# Patient Record
Sex: FEMALE | Race: WHITE | NOT HISPANIC OR LATINO | Employment: OTHER | URBAN - METROPOLITAN AREA
[De-identification: names, ages, dates, MRNs, and addresses within clinical notes are randomized per-mention and may not be internally consistent; named-entity substitution may affect disease eponyms.]

---

## 2020-09-29 ENCOUNTER — HISTORICAL (OUTPATIENT)
Dept: ADMINISTRATIVE | Facility: HOSPITAL | Age: 85
End: 2020-09-29

## 2020-10-01 LAB — FINAL CULTURE: NORMAL

## 2022-05-27 ENCOUNTER — HOSPITAL ENCOUNTER (INPATIENT)
Facility: HOSPITAL | Age: 87
LOS: 1 days | Discharge: HOME | DRG: 125 | End: 2022-05-28
Attending: EMERGENCY MEDICINE | Admitting: SURGERY
Payer: MEDICARE

## 2022-05-27 DIAGNOSIS — S02.31XA CLOSED BLOW-OUT FRACTURE OF RIGHT ORBITAL FLOOR: Primary | ICD-10-CM

## 2022-05-27 DIAGNOSIS — H05.239 RETROBULBAR HEMATOMA: ICD-10-CM

## 2022-05-27 DIAGNOSIS — S09.90XA CLOSED HEAD INJURY WITHOUT LOSS OF CONSCIOUSNESS, INITIAL ENCOUNTER: ICD-10-CM

## 2022-05-27 DIAGNOSIS — K92.0 HEMATEMESIS WITH NAUSEA: ICD-10-CM

## 2022-05-27 DIAGNOSIS — R42 DIZZINESS: ICD-10-CM

## 2022-05-27 LAB
ALBUMIN SERPL-MCNC: 3.5 GM/DL (ref 3.4–4.8)
ALBUMIN/GLOB SERPL: 1.1 RATIO (ref 1.1–2)
ALP SERPL-CCNC: 94 UNIT/L (ref 40–150)
ALT SERPL-CCNC: 37 UNIT/L (ref 0–55)
APTT PPP: 27.3 SECONDS (ref 23.2–33.7)
AST SERPL-CCNC: 74 UNIT/L (ref 5–34)
BASOPHILS # BLD AUTO: 0.03 X10(3)/MCL (ref 0–0.2)
BASOPHILS NFR BLD AUTO: 0.3 %
BILIRUBIN DIRECT+TOT PNL SERPL-MCNC: 0.5 MG/DL
BUN SERPL-MCNC: 14.7 MG/DL (ref 8.4–25.7)
CALCIUM SERPL-MCNC: 9.2 MG/DL (ref 8.8–10)
CHLORIDE SERPL-SCNC: 105 MMOL/L (ref 98–107)
CO2 SERPL-SCNC: 22 MMOL/L (ref 23–31)
CREAT SERPL-MCNC: 0.9 MG/DL (ref 0.73–1.18)
EOSINOPHIL # BLD AUTO: 0.12 X10(3)/MCL (ref 0–0.9)
EOSINOPHIL NFR BLD AUTO: 1.2 %
ERYTHROCYTE [DISTWIDTH] IN BLOOD BY AUTOMATED COUNT: 14.5 % (ref 11.5–17)
ETHANOL SERPL-MCNC: 27 MG/DL
GLOBULIN SER-MCNC: 3.1 GM/DL (ref 2.4–3.5)
GLUCOSE SERPL-MCNC: 97 MG/DL (ref 82–115)
GROUP & RH: NORMAL
HCT VFR BLD AUTO: 39.4 %
HGB BLD-MCNC: 12.8 GM/DL
IMM GRANULOCYTES # BLD AUTO: 0.07 X10(3)/MCL (ref 0–0.02)
IMM GRANULOCYTES NFR BLD AUTO: 0.7 % (ref 0–0.43)
INDIRECT COOMBS GEL: NORMAL
INR BLD: 1.05 (ref 0–1.3)
LACTATE SERPL-SCNC: 1.1 MMOL/L (ref 0.5–2.2)
LACTATE SERPL-SCNC: 2.8 MMOL/L (ref 0.5–2.2)
LYMPHOCYTES # BLD AUTO: 1.88 X10(3)/MCL (ref 0.6–4.6)
LYMPHOCYTES NFR BLD AUTO: 19 %
MCH RBC QN AUTO: 33.1 PG (ref 27–31)
MCHC RBC AUTO-ENTMCNC: 32.5 MG/DL (ref 33–36)
MCV RBC AUTO: 101.8 FL (ref 80–94)
MONOCYTES # BLD AUTO: 0.8 X10(3)/MCL (ref 0.1–1.3)
MONOCYTES NFR BLD AUTO: 8.1 %
NEUTROPHILS # BLD AUTO: 7 X10(3)/MCL (ref 2.1–9.2)
NEUTROPHILS NFR BLD AUTO: 70.7 %
NRBC BLD AUTO-RTO: 0 %
PLATELET # BLD AUTO: 208 X10(3)/MCL (ref 130–400)
PMV BLD AUTO: 12.2 FL (ref 9.4–12.4)
POTASSIUM SERPL-SCNC: 4 MMOL/L (ref 3.5–5.1)
PROT SERPL-MCNC: 6.6 GM/DL (ref 5.8–7.6)
PROTHROMBIN TIME: 13.4 SECONDS (ref 12.5–14.5)
RBC # BLD AUTO: 3.87 X10(6)/MCL
SODIUM SERPL-SCNC: 138 MMOL/L (ref 136–145)
WBC # SPEC AUTO: 9.9 X10(3)/MCL (ref 4.5–11.5)

## 2022-05-27 PROCEDURE — 25000003 PHARM REV CODE 250: Performed by: EMERGENCY MEDICINE

## 2022-05-27 PROCEDURE — 96376 TX/PRO/DX INJ SAME DRUG ADON: CPT

## 2022-05-27 PROCEDURE — 36415 COLL VENOUS BLD VENIPUNCTURE: CPT | Performed by: SURGERY

## 2022-05-27 PROCEDURE — 99285 EMERGENCY DEPT VISIT HI MDM: CPT | Mod: 25

## 2022-05-27 PROCEDURE — 96365 THER/PROPH/DIAG IV INF INIT: CPT

## 2022-05-27 PROCEDURE — 11000001 HC ACUTE MED/SURG PRIVATE ROOM

## 2022-05-27 PROCEDURE — 63600175 PHARM REV CODE 636 W HCPCS: Performed by: STUDENT IN AN ORGANIZED HEALTH CARE EDUCATION/TRAINING PROGRAM

## 2022-05-27 PROCEDURE — 86850 RBC ANTIBODY SCREEN: CPT | Performed by: SURGERY

## 2022-05-27 PROCEDURE — 96372 THER/PROPH/DIAG INJ SC/IM: CPT | Performed by: EMERGENCY MEDICINE

## 2022-05-27 PROCEDURE — 83605 ASSAY OF LACTIC ACID: CPT | Performed by: SURGERY

## 2022-05-27 PROCEDURE — C9113 INJ PANTOPRAZOLE SODIUM, VIA: HCPCS | Performed by: EMERGENCY MEDICINE

## 2022-05-27 PROCEDURE — 80053 COMPREHEN METABOLIC PANEL: CPT | Performed by: SURGERY

## 2022-05-27 PROCEDURE — 82077 ASSAY SPEC XCP UR&BREATH IA: CPT | Performed by: SURGERY

## 2022-05-27 PROCEDURE — 85730 THROMBOPLASTIN TIME PARTIAL: CPT | Performed by: SURGERY

## 2022-05-27 PROCEDURE — 96375 TX/PRO/DX INJ NEW DRUG ADDON: CPT

## 2022-05-27 PROCEDURE — 63600175 PHARM REV CODE 636 W HCPCS: Performed by: EMERGENCY MEDICINE

## 2022-05-27 PROCEDURE — 94761 N-INVAS EAR/PLS OXIMETRY MLT: CPT

## 2022-05-27 PROCEDURE — 85025 COMPLETE CBC W/AUTO DIFF WBC: CPT | Performed by: SURGERY

## 2022-05-27 PROCEDURE — 93005 ELECTROCARDIOGRAM TRACING: CPT

## 2022-05-27 PROCEDURE — 25000003 PHARM REV CODE 250: Performed by: STUDENT IN AN ORGANIZED HEALTH CARE EDUCATION/TRAINING PROGRAM

## 2022-05-27 PROCEDURE — G0390 TRAUMA RESPONS W/HOSP CRITI: HCPCS

## 2022-05-27 PROCEDURE — 85610 PROTHROMBIN TIME: CPT | Performed by: SURGERY

## 2022-05-27 RX ORDER — FENTANYL CITRATE 50 UG/ML
25 INJECTION, SOLUTION INTRAMUSCULAR; INTRAVENOUS
Status: COMPLETED | OUTPATIENT
Start: 2022-05-27 | End: 2022-05-27

## 2022-05-27 RX ORDER — ONDANSETRON 2 MG/ML
4 INJECTION INTRAMUSCULAR; INTRAVENOUS EVERY 6 HOURS PRN
Status: DISCONTINUED | OUTPATIENT
Start: 2022-05-27 | End: 2022-05-28 | Stop reason: HOSPADM

## 2022-05-27 RX ORDER — CELECOXIB 100 MG/1
100 CAPSULE ORAL 2 TIMES DAILY
COMMUNITY

## 2022-05-27 RX ORDER — OXYCODONE HYDROCHLORIDE 5 MG/1
10 TABLET ORAL EVERY 4 HOURS PRN
Status: DISCONTINUED | OUTPATIENT
Start: 2022-05-27 | End: 2022-05-28 | Stop reason: HOSPADM

## 2022-05-27 RX ORDER — ASPIRIN 81 MG/1
81 TABLET ORAL DAILY
COMMUNITY

## 2022-05-27 RX ORDER — CITALOPRAM 40 MG/1
40 TABLET, FILM COATED ORAL DAILY
Status: ON HOLD | COMMUNITY
End: 2022-05-28 | Stop reason: HOSPADM

## 2022-05-27 RX ORDER — NYSTATIN 100000 U/G
CREAM TOPICAL 2 TIMES DAILY
Status: ON HOLD | COMMUNITY
End: 2022-05-28 | Stop reason: HOSPADM

## 2022-05-27 RX ORDER — MELATONIN 5 MG
CAPSULE ORAL
COMMUNITY

## 2022-05-27 RX ORDER — GABAPENTIN 100 MG/1
100 CAPSULE ORAL 3 TIMES DAILY
Status: DISCONTINUED | OUTPATIENT
Start: 2022-05-27 | End: 2022-05-28 | Stop reason: HOSPADM

## 2022-05-27 RX ORDER — SODIUM CHLORIDE 9 MG/ML
INJECTION, SOLUTION INTRAVENOUS CONTINUOUS
Status: DISCONTINUED | OUTPATIENT
Start: 2022-05-27 | End: 2022-05-28

## 2022-05-27 RX ORDER — NEBIVOLOL 5 MG/1
5 TABLET ORAL DAILY
Status: ON HOLD | COMMUNITY
End: 2022-05-28 | Stop reason: HOSPADM

## 2022-05-27 RX ORDER — OXYCODONE HYDROCHLORIDE 5 MG/1
5 TABLET ORAL EVERY 4 HOURS PRN
Status: DISCONTINUED | OUTPATIENT
Start: 2022-05-27 | End: 2022-05-28 | Stop reason: HOSPADM

## 2022-05-27 RX ORDER — ROPINIROLE 2 MG/1
1 TABLET, FILM COATED ORAL NIGHTLY
COMMUNITY

## 2022-05-27 RX ORDER — ACETAMINOPHEN 325 MG/1
650 TABLET ORAL EVERY 6 HOURS PRN
Status: DISCONTINUED | OUTPATIENT
Start: 2022-05-27 | End: 2022-05-27

## 2022-05-27 RX ORDER — ACETAMINOPHEN 325 MG/1
650 TABLET ORAL EVERY 6 HOURS
Status: DISCONTINUED | OUTPATIENT
Start: 2022-05-28 | End: 2022-05-28 | Stop reason: HOSPADM

## 2022-05-27 RX ORDER — PROMETHAZINE HYDROCHLORIDE 25 MG/ML
12.5 INJECTION, SOLUTION INTRAMUSCULAR; INTRAVENOUS
Status: COMPLETED | OUTPATIENT
Start: 2022-05-27 | End: 2022-05-27

## 2022-05-27 RX ORDER — TALC
6 POWDER (GRAM) TOPICAL NIGHTLY PRN
Status: DISCONTINUED | OUTPATIENT
Start: 2022-05-27 | End: 2022-05-28 | Stop reason: HOSPADM

## 2022-05-27 RX ORDER — ACETAMINOPHEN 500 MG
1000 TABLET ORAL
Status: COMPLETED | OUTPATIENT
Start: 2022-05-27 | End: 2022-05-27

## 2022-05-27 RX ORDER — PANTOPRAZOLE SODIUM 40 MG/10ML
80 INJECTION, POWDER, LYOPHILIZED, FOR SOLUTION INTRAVENOUS
Status: COMPLETED | OUTPATIENT
Start: 2022-05-27 | End: 2022-05-27

## 2022-05-27 RX ORDER — FENTANYL CITRATE 50 UG/ML
50 INJECTION, SOLUTION INTRAMUSCULAR; INTRAVENOUS
Status: COMPLETED | OUTPATIENT
Start: 2022-05-27 | End: 2022-05-27

## 2022-05-27 RX ORDER — FLUCONAZOLE 150 MG/1
150 TABLET ORAL
Status: ON HOLD | COMMUNITY
End: 2022-05-28 | Stop reason: HOSPADM

## 2022-05-27 RX ORDER — MORPHINE SULFATE 4 MG/ML
2 INJECTION, SOLUTION INTRAMUSCULAR; INTRAVENOUS
Status: DISCONTINUED | OUTPATIENT
Start: 2022-05-27 | End: 2022-05-28 | Stop reason: HOSPADM

## 2022-05-27 RX ADMIN — FENTANYL CITRATE 50 MCG: 50 INJECTION INTRAMUSCULAR; INTRAVENOUS at 03:05

## 2022-05-27 RX ADMIN — PANTOPRAZOLE SODIUM 80 MG: 40 INJECTION, POWDER, FOR SOLUTION INTRAVENOUS at 01:05

## 2022-05-27 RX ADMIN — PROMETHAZINE HYDROCHLORIDE 12.5 MG: 25 INJECTION, SOLUTION INTRAMUSCULAR; INTRAVENOUS at 01:05

## 2022-05-27 RX ADMIN — MELATONIN TAB 3 MG 6 MG: 3 TAB at 10:05

## 2022-05-27 RX ADMIN — OXYCODONE HYDROCHLORIDE 5 MG: 5 TABLET ORAL at 10:05

## 2022-05-27 RX ADMIN — AMPICILLIN SODIUM AND SULBACTAM SODIUM 1.5 G: 1; .5 INJECTION, POWDER, FOR SOLUTION INTRAMUSCULAR; INTRAVENOUS at 08:05

## 2022-05-27 RX ADMIN — FENTANYL CITRATE 25 MCG: 50 INJECTION INTRAMUSCULAR; INTRAVENOUS at 01:05

## 2022-05-27 RX ADMIN — SODIUM CHLORIDE: 9 INJECTION, SOLUTION INTRAVENOUS at 08:05

## 2022-05-27 RX ADMIN — ACETAMINOPHEN 1000 MG: 500 TABLET ORAL at 03:05

## 2022-05-27 NOTE — H&P
Trauma/Acute Care Surgery   H&P Note     HD#0  POD#* No surgery found *    HPI  Patient is an 86yo F with PMHx of HTN, hypothyroid, who comes to the ED as a trauma activation following ground level fall in which she hit her head. Patient is complaining of pain and tenderness to the right face. Patient was GCS 15 on arrival. She was complaining of headaches, visual disturbances, nausea, and had an episode of dark brown emesis in the trauma bay. Denies fever, chills, chest pain, SOB, new onset motor/sensory deficits.    Surgery consulted for admission following occular and facial trauma.     Scheduled Meds:    Continuous Infusions:    PRN Meds:acetaminophen, melatonin, morphine, ondansetron, oxyCODONE, oxyCODONE     Objective  Temp:  [98.4 °F (36.9 °C)] 98.4 °F (36.9 °C)  Pulse:  [53-69] 53  Resp:  [14-24] 14  SpO2:  [90 %-98 %] 95 %  BP: (123-180)/(65-85) 164/71     No intake/output data recorded.  I/O this shift:  In: 0.5 [I.V.:0.5]  Out: -      GEN: NAD, ecchymosis to R orbit and face, TTP, R eye hematoma, R pupil sluggish, L pupil unreactive (blind)  NEURO: AAOx3  RESP: No increased WOB, equal rise and fall of chest  CV: RR, 2+ distal pulses  ABD: Soft, NT, ND. No guarding/rebound  : Richards none  EXT: moving all spontaneously     Labs  Recent Labs     05/27/22  1303   WBC 9.9   HGB 12.8   HCT 39.4      PTT 27.3   INR 1.05     Recent Labs     05/27/22  1303      K 4.0   CO2 22*   BUN 14.7   CREATININE 0.90   CALCIUM 9.2   ALBUMIN 3.5   BILITOT 0.5   AST 74*   ALKPHOS 94   ALT 37       Imaging  CXR and Xray Pelvis unremarkable    CT Head  1. No acute intracranial abnormalities.  2. Acute right orbital floor fracture with small volume right retrobulbar hemorrhage and proptosis.    CT Head  1. No appreciable acute osseous abnormality  2. Severe cervical spondylosis  3. Degenerative listhesis at C2-C3.    CT MaxFace  1. Right orbital floor fracture with herniated orbital contents.  There is herniated  orbital fat and distortion of the normal course of the inferior rectus muscle without alice muscular herniation.  2. Retro bulbar fat stranding suggestive of hematoma.  Right globe appears mildly proptotic.  3. Right nasal bone fracture  4. Right frontal scalp and periorbital hematoma  5. Right maxillary hemosinus    Assessment/Plan  88yo F with above PMHx presenting to ED as a trauma activation s/p fall with subsequent R orbital floor fracture with herniated contents, R retrobulbar hemorrhage/proptosis, R nasal bone fracture, R frontal scalp and periorbital hematoma, R maxillary hemosinus.    - Admit to trauma  - Consult optho  - Consult OMFS  - NPO until surgical plans finalized by consult teams  - mIVF  - MM pain meds PRN  - OK for floor at this time  - Q4 neurovascular checks  - Hold lovenox  - SCDs  - IS    Fernando Tryo MD  [facility] General Surgery      5/27/2022  6:55 PM

## 2022-05-27 NOTE — ED NOTES
Pt. Transferred to trauma stretcher. Pt. Awake and alert. Monitors applied. Trauma team at bedside assessing pt.

## 2022-05-27 NOTE — ED PROVIDER NOTES
Encounter Date: 5/27/2022    SCRIBE #1 NOTE: I, Peng Kaplan, am scribing for, and in the presence of,  Natali Mcclain MD. I have scribed the following portions of the note - Other sections scribed: HPI, ROS, Physical exam.   SCRIBE #2 NOTE: I Benson Satish, am scribing for, and in the presence of,  Natail Mcclain MD. I have scribed the following portions of the note - Other sections scribed: HPI, ROS, physical exam.     History     Chief Complaint   Patient presents with    Trauma     88 y/o female with history of HTN, and thyroid disease, presenting to the ED via EMS as a level 1 trauma due to ground level fall onto concrete while bending over. Pt struck her head when she fell resulting in a black eye. Pt complains of headache, nausea but denies any SOB. EMS reports the pt has been GCS 15 throughout. She denies taking any blood thinners.     The history is provided by the patient and the EMS personnel. No  was used.   Fall  The accident occurred today. The fall occurred while standing (Got dizzy while bending over). Libertad Ibrahim fell from a height of 3 to 5 ft. Libertad Ibrahim landed on concrete. The volume of blood lost was minimal. The point of impact was the head and face. The pain is present in the face and head (Right eye). The pain is at a severity of 8/10. There was no entrapment after the fall. Associated symptoms include nausea, vomiting (Hemotemesis) and headaches. Pertinent negatives include no back pain, no fever, no numbness and no abdominal pain.     Review of patient's allergies indicates:  No Known Allergies  Past Medical History:   Diagnosis Date    Hematemesis with nausea     Hypertension     Thyroid disease      History reviewed. No pertinent surgical history.  History reviewed. No pertinent family history.  Social History     Tobacco Use    Smoking status: Never Smoker    Smokeless tobacco: Never Used   Substance Use Topics    Alcohol use: Never     Drug use: Never     Review of Systems   Constitutional: Negative for chills and fever.   HENT: Negative for ear pain and nosebleeds.    Eyes: Positive for pain. Negative for visual disturbance.   Respiratory: Negative for shortness of breath.    Cardiovascular: Negative for chest pain.   Gastrointestinal: Positive for nausea and vomiting (Hemotemesis). Negative for abdominal pain.   Musculoskeletal: Negative for back pain and myalgias.   Skin: Negative for wound.   Neurological: Positive for headaches. Negative for dizziness, syncope, weakness and numbness.   All other systems reviewed and are negative.      Physical Exam     Initial Vitals   BP Pulse Resp Temp SpO2   05/27/22 1257 05/27/22 1257 05/27/22 1257 05/27/22 1305 05/27/22 1257   (!) 160/85 64 20 98.4 °F (36.9 °C) (!) 90 %      MAP       --                Physical Exam    Nursing note and vitals reviewed.  Constitutional: Airway: Normal. No distress.   HENT:   Head: Normocephalic.   Mouth/Throat: Oropharynx is clear and moist.   Blood in R nare   Eyes:   Slit lamp exam:       The right eye shows no hyphema.   R periorbital Contusions, small subconjuctable hemorrage, R pupil 4 mm, and sluggish to light, L pupil unreactive, s/t blindness. OD IOP 20 mmHg via icare tonopen   Neck: Neck supple.   Normal range of motion.  Cardiovascular: Normal rate and regular rhythm.   No murmur heard.  2+radials, 2+DP's   Pulmonary/Chest: Breath sounds normal. No respiratory distress. Libertad Ibrahim exhibits no tenderness.   Chest wall stable and non tender      Abdominal: Abdomen is soft. Bowel sounds are normal. Libertad Ibrahim exhibits no distension. There is no abdominal tenderness.   Musculoskeletal:      Cervical back: Normal range of motion and neck supple. No bony tenderness.      Thoracic back: No bony tenderness.      Lumbar back: No bony tenderness.      Comments: Full active ROM of extremities w/o pain, post surgical changes to bilateral LE      Neurological: Libertad Ibrahim is alert and oriented to person, place, and time. Libertad Ibrahim has normal strength. No cranial nerve deficit or sensory deficit.   Skin: Skin is warm and dry.         ED Course   Critical Care    Date/Time: 5/27/2022 12:52 PM  Performed by: Natali Mcclain MD  Authorized by: Natali Mcclain MD   Direct patient critical care time: 30 minutes  Additional history critical care time: 2 minutes  Ordering / reviewing critical care time: 5 minutes  Documentation critical care time: 5 minutes  Consulting other physicians critical care time: 6 minutes  Total critical care time (exclusive of procedural time) : 48 minutes  Critical care time was exclusive of separately billable procedures and treating other patients.  Critical care was necessary to treat or prevent imminent or life-threatening deterioration of the following conditions: respiratory failure, circulatory failure and trauma.  Critical care was time spent personally by me on the following activities: discussions with consultants, evaluation of patient's response to treatment, examination of patient, obtaining history from patient or surrogate, ordering and performing treatments and interventions, ordering and review of laboratory studies, pulse oximetry, ordering and review of radiographic studies and re-evaluation of patient's condition.        Labs Reviewed   COMPREHENSIVE METABOLIC PANEL - Abnormal; Notable for the following components:       Result Value    Carbon Dioxide 22 (*)     Aspartate Aminotransferase 74 (*)     All other components within normal limits   LACTIC ACID, PLASMA - Abnormal; Notable for the following components:    Lactic Acid Level 2.8 (*)     All other components within normal limits   ALCOHOL,MEDICAL (ETHANOL) - Abnormal; Notable for the following components:    Ethanol Level 27.0 (*)     All other components within normal limits   CBC WITH DIFFERENTIAL - Abnormal; Notable for the  following components:    .8 (*)     MCH 33.1 (*)     MCHC 32.5 (*)     IG# 0.07 (*)     IG% 0.7 (*)     All other components within normal limits   PROTIME-INR - Normal   APTT - Normal   LACTIC ACID, PLASMA - Normal   CBC W/ AUTO DIFFERENTIAL    Narrative:     The following orders were created for panel order CBC auto differential.  Procedure                               Abnormality         Status                     ---------                               -----------         ------                     CBC with Differential[[id number]]        Abnormal            Final result                 Please view results for these tests on the individual orders.   TYPE & SCREEN     EKG Readings: (Independently Interpreted)   Initial Reading: No STEMI. Rhythm: Normal Sinus Rhythm. Ectopy: PVCs Frequent. Conduction: 1st Degree AV Block. ST Segments: Normal ST Segments. T Waves: Normal.   05/27/2022 @ 1439     ECG Results          EKG 12-lead (Final result)  Result time 06/05/22 17:31:54    Final result by Interface, Lab In [facility] (06/05/22 17:31:54)                 Narrative:    Test Reason : R42,    Vent. Rate : 060 BPM     Atrial Rate : 060 BPM     P-R Int : 232 ms          QRS Dur : 078 ms      QT Int : 454 ms       P-R-T Axes : 074 054 078 degrees     QTc Int : 454 ms    Sinus rhythm with 1st degree A-V block with occasional Premature  ventricular complexes  Otherwise normal ECG  No previous ECGs available  Confirmed by Bang Rangel MD ([id number]) on 6/5/2022 5:31:45 PM    Referred By: [email or web address]   SELF           Confirmed By:Bang Rangel MD                             EKG 12-LEAD (Final result)  Result time 06/07/22 14:56:26    Final result by Unknown User (06/07/22 14:56:26)                                Imaging Results          CT Maxillofacial Without Contrast (Final result)  Result time 05/27/22 14:04:32    Final result by Lisa Vincent MD (05/27/22 14:04:32)                 Impression:      1. Right  orbital floor fracture with herniated orbital contents.  There is herniated orbital fat and distortion of the normal course of the inferior rectus muscle without alice muscular herniation.  2. Retro bulbar fat stranding suggestive of hematoma.  Right globe appears mildly proptotic.  3. Right nasal bone fracture  4. Right frontal scalp and periorbital hematoma  5. Right maxillary hemosinus      Electronically signed by: Lisa Vincent  Date:    05/27/2022  Time:    14:04             Narrative:    EXAMINATION:  CT MAXILLOFACIAL WITHOUT CONTRAST    CLINICAL HISTORY:  Facial trauma, blunt;    TECHNIQUE:  Noncontrast maxillofacial CT performed with axial, sagittal and coronal reconstructions.    DLP: 634 mGycm    All CT scans at this location are performed using dose optimization techniques as appropriate to including automated exposure control, adjustment of the mA and/or kV according to patient size and/or use of iterative reconstruction technique    COMPARISON:  No relevant prior available for comparison.    FINDINGS:  BONES: The floor of the orbit is fractured on the right with approximately 8 mm fracture defect containing herniated orbital fat.  The right nasal bone is fractured with approximately 1-2 mm medial displacement.    SINUSES: There is right hemosinus.    ORBITS: Globes are intact.  The right globe appears mildly proptotic.  There is herniation of orbital fat through the fracture of the floor of the orbit on the right.  There is some retro bulbar fat stranding on the right.  The inferior rectus muscle curves toward the orbital floor defect (series 11, image 24).    DENTITION: Edentulous patient.    SOFT TISSUES: There is large periorbital and right frontal scalp hematoma    BRAIN: Visualized intracranial structures appear unremarkable.    MASTOIDS: Well aerated.                               CT Cervical Spine Without Contrast (Final result)  Result time 05/27/22 13:53:39    Final result by Lisa CRUZ  MD Carlton (05/27/22 13:53:39)                 Impression:      1. No appreciable acute osseous abnormality  2. Severe cervical spondylosis  3. Degenerative listhesis at C2-C3.      Electronically signed by: Lisa Vincent  Date:    05/27/2022  Time:    13:53             Narrative:    EXAMINATION:  CT CERVICAL SPINE WITHOUT CONTRAST    CLINICAL HISTORY:  Trauma;    TECHNIQUE:  Low dose helical acquired images with axial, sagittal and coronal reformations though the cervical spine.  Contrast was not administered.    All CT scans at this location are performed using dose optimization techniques as appropriate to a performed exam including the following automated exposure control, adjustment of the mA and/or kV according to patient size and/or use of iterative reconstruction technique    DLP: 434 mGycm    COMPARISON:  No relevant prior available for comparison.    FINDINGS:  LIMITATIONS: Beam hardening artifact related to patient's shoulder arthroplasties mildly obscures evaluation at the mid to lower cervical spine.    BONES: No appreciable fracture.  There is grade 1 anterolisthesis of C2 on C3 related to facet arthropathy.  The dens is intact, the lateral masses of C1 are normally aligned.  There are degenerative changes at the atlantodental interval.    DEGENERATIVE CHANGE: There is severe multilevel disc space narrowing throughout the cervical spine.  There is ankylosis of the C3 and C4 vertebral bodies and facets.  There is ankylosis at the C7-T1 facet joints.  There is severe multilevel facet arthropathy.  Severe degenerative changes present at the articulation of the lateral masses of C1-C2 on the left.  Multilevel facet and uncovertebral hypertrophy contribute to multilevel severe bilateral neural foraminal stenosis.    SPINAL CANAL: No appreciable osseous narrowing of the spinal canal taking into account artifact related to shoulder arthroplasties.    SOFT TISSUES: There are bilateral cervical carotid  calcifications.  Retropharyngeal course of the right carotid artery.    LUNG APICES: Clear                               CT Head Without Contrast (Final result)  Result time 05/27/22 13:59:21    Final result by Hilario Cruz MD (05/27/22 13:59:21)                 Impression:      1. No acute intracranial abnormalities.  2. Acute right orbital floor fracture with small volume right retrobulbar hemorrhage and proptosis.      Electronically signed by: Hilario Cruz  Date:    05/27/2022  Time:    13:59             Narrative:    EXAMINATION:  CT HEAD WITHOUT CONTRAST    CLINICAL HISTORY:  Trauma;    TECHNIQUE:  Axial scans were obtained from skull base to the vertex.    Coronal and sagittal reconstructions obtained from the axial data.    Automatic exposure control was utilized to limit radiation dose.    Contrast: None    Radiation Dose:    Total DLP: 2050 mGy*cm    COMPARISON:  None    FINDINGS:  Scalp/Skull:    Acute right frontal scalp/right periorbital hematoma.    No calvarial fractures.    Brain sulci: Appropriate for patient's age.    Ventricles: Normal in size and configuration. No hydrocephalus.    Extra-axial spaces:    No masses or fluid collections.    Parenchyma:    Small bicerebellar foci of cortical based encephalomalacia compatible with remote insults.    Discrete and confluent supratentorial white matter hypodensities are mild-moderate nonspecific chronic microangiopathic changes, statistically chronic microvascular ischemia.    No mass, hemorrhage or CT evidence of an acute vascular insult.    Dural sinuses: No abnormal densities.    Sellar/Suprasellar region: No abnormalities.    Skull base and Craniocervical junction: No abnormalities.    Additional findings:    Acute depressed right orbital floor fracture.    Small volume right intraconal hemorrhage with associated proptosis.                               X-Ray Pelvis Routine AP (Final result)  Result time 05/27/22 13:43:42    Final result by  Lisa Vincent MD (05/27/22 13:43:42)                 Impression:      No acute osseous abnormality.      Electronically signed by: Lisa Vincent  Date:    05/27/2022  Time:    13:43             Narrative:    EXAMINATION:  XR PELVIS ROUTINE AP    CLINICAL HISTORY:  r/o bleeding or hemorrhage;    TECHNIQUE:  AP view of the pelvis was performed.    COMPARISON:  None.    FINDINGS:  No appreciable fracture. No dislocation. There are postsurgical changes of left hip arthroplasty.  Inferior aspect of the femoral stem is excluded from the collimation of the radiograph.  Where visible hardware appears engaged and intact without periprosthetic fracture.    Regional soft tissues are unremarkable.                               X-Ray Chest 1 View (Final result)  Result time 05/27/22 13:28:49    Final result by Zeeshan Reyes MD (05/27/22 13:28:49)                 Impression:      No acute chest disease is identified.      Electronically signed by: Zeeshan Reyes  Date:    05/27/2022  Time:    13:28             Narrative:    EXAMINATION:  XR CHEST 1 VIEW    CLINICAL HISTORY:  r/o bleeding or hemorrhage;, .    FINDINGS:  No alveolar consolidation, effusion, or pneumothorax is seen.   The thoracic aorta is ectatic and calcified, but otherwise the  cardiac silhouette, central pulmonary vessels and mediastinum are normal in size and are grossly unremarkable. Except for degenerative changes, the  visualized osseous structures are grossly unremarkable.                                 Medications   pantoprazole injection 80 mg (80 mg Intravenous Given 5/27/22 1315)   promethazine injection 12.5 mg (12.5 mg Intramuscular Given 5/27/22 1315)   fentaNYL injection 25 mcg (25 mcg Intravenous Given 5/27/22 1315)   acetaminophen tablet 1,000 mg (1,000 mg Oral Given 5/27/22 1545)   fentaNYL injection 50 mcg (50 mcg Intravenous Given 5/27/22 1545)     Medical Decision Making:   Initial Assessment:   Ms. Healy presented for  evaluation after ground level fall at home, dizziness antecedent to fall.  Initially activated as a level 1 trauma; however, stable on ED arrival.  Facial contusions noted however vision intact.  She does have pain with extraocular movements.  While in [facility], started with active hematemesis, relatively large quantity, no epistaxis or oropharyngeal bleeding noted.    Differential Diagnosis:   Close head injury, intracranial hemorrhage, facial fractures, epistaxis, upper GI bleed, syncope, acute blood loss anemia  Clinical Tests:   Lab Tests: Ordered and Reviewed  Radiological Study: Ordered and Reviewed  Medical Tests: Ordered and Reviewed  ED Management:  Hematemesis improved after antiemetics.  No significant anemia on labs.  CT of the head demonstrates no intracranial hemorrhage.  CT of the face demonstrates over blowout fracture with irregularity of the inferior rectus muscle.  She does have pain with upper gaze concerning for possible entrapment; however, vision preserved.  Note made also of retro-orbital inflammatory changes, possible contusion.  Mild proptosis described.  Intra-ocular pressure is maintained at this time and pupil is briskly reactive.  The affected eye is markedly swollen, completely shut at this time.  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  Will admit for further inpatient stabilization, evaluation at bedside by facial trauma and ophthalmology while in the ED, no acute surgical intervention planned.    Regarding hematemesis, may benefit from inpatient GI evaluation.  Currently hemodynamically stable.  Above  Other:   I have discussed this case with another health care provider.       <> Summary of the Discussion: Discussed with ophthalmology - evaluated at bedside  Discussed with facial trauma - evaluated at bedside  Discussed with trauma resident - will admit          Scribe Attestation:   Scribe #1: I performed the above scribed service and the documentation accurately  describes the services I performed. I attest to the accuracy of the note.  Scribe #2: I performed the above scribed service and the documentation accurately describes the services I performed. I attest to the accuracy of the note.    Attending Attestation:           Physician Attestation for Scribe:  Physician Attestation Statement for Scribe #1: I, Natali Mcclain MD, reviewed documentation, as scribed by Peng Kaplan  in my presence, and it is both accurate and complete.   Physician Attestation Statement for Scribe #2: I, Natali Mcclain MD, reviewed documentation, as scribed by Benson Covarrubias in my presence, and it is both accurate and complete. I also acknowledge and confirm the content of the note done by Scribe #1.          ED Course as of 06/08/22 1852   Fri May 27, 2022   1301 Pt is is actively vomiting blood. She denies any nausea prior to the fall and denies any consumption of red foods this morning. She states her last BM was yesterday and was normal with no blood. Pt states the head pain was somewhat relieved after vomiting.  [KH]   6087 Ophthalmology paged to discuss findings on CT face. [KS]   3240 Dr. Hanks at bedside for evaluation [KS]      ED Course User Index  [KH] Benson Covarrubias  [KS] Natali Mcclain MD             Clinical Impression:   Final diagnoses:  [R42] Dizziness  [S02.31XA] Closed blow-out fracture of right orbital floor (Primary)  [S09.90XA] Closed head injury without loss of consciousness, initial encounter  [H05.239] Retrobulbar hematoma  [K92.0] Hematemesis with nausea          ED Disposition Condition    Admit               Natali Mcclain MD  06/08/22 1854

## 2022-05-28 VITALS
WEIGHT: 220 LBS | OXYGEN SATURATION: 91 % | HEART RATE: 57 BPM | DIASTOLIC BLOOD PRESSURE: 64 MMHG | TEMPERATURE: 98 F | RESPIRATION RATE: 18 BRPM | BODY MASS INDEX: 33.34 KG/M2 | SYSTOLIC BLOOD PRESSURE: 109 MMHG | HEIGHT: 68 IN

## 2022-05-28 PROBLEM — S09.90XA CLOSED HEAD INJURY WITHOUT LOSS OF CONSCIOUSNESS: Status: ACTIVE | Noted: 2022-05-28

## 2022-05-28 LAB
ANION GAP SERPL CALC-SCNC: 5 MEQ/L
BASOPHILS # BLD AUTO: 0.02 X10(3)/MCL (ref 0–0.2)
BASOPHILS NFR BLD AUTO: 0.3 %
BUN SERPL-MCNC: 19.1 MG/DL (ref 8.4–25.7)
CALCIUM SERPL-MCNC: 9.1 MG/DL (ref 8.8–10)
CHLORIDE SERPL-SCNC: 103 MMOL/L (ref 98–107)
CO2 SERPL-SCNC: 32 MMOL/L (ref 23–31)
CREAT SERPL-MCNC: 1.21 MG/DL (ref 0.73–1.18)
CREAT/UREA NIT SERPL: 16
EOSINOPHIL # BLD AUTO: 0.15 X10(3)/MCL (ref 0–0.9)
EOSINOPHIL NFR BLD AUTO: 2 %
ERYTHROCYTE [DISTWIDTH] IN BLOOD BY AUTOMATED COUNT: 14.6 % (ref 11.5–17)
GLUCOSE SERPL-MCNC: 83 MG/DL (ref 82–115)
HCT VFR BLD AUTO: 38 %
HGB BLD-MCNC: 12 GM/DL
IMM GRANULOCYTES # BLD AUTO: 0.05 X10(3)/MCL (ref 0–0.02)
IMM GRANULOCYTES NFR BLD AUTO: 0.7 % (ref 0–0.43)
LYMPHOCYTES # BLD AUTO: 1.45 X10(3)/MCL (ref 0.6–4.6)
LYMPHOCYTES NFR BLD AUTO: 18.9 %
MAGNESIUM SERPL-MCNC: 1.9 MG/DL (ref 1.6–2.6)
MCH RBC QN AUTO: 32.8 PG (ref 27–31)
MCHC RBC AUTO-ENTMCNC: 31.6 MG/DL (ref 33–36)
MCV RBC AUTO: 103.8 FL (ref 80–94)
MONOCYTES # BLD AUTO: 0.76 X10(3)/MCL (ref 0.1–1.3)
MONOCYTES NFR BLD AUTO: 9.9 %
NEUTROPHILS # BLD AUTO: 5.3 X10(3)/MCL (ref 2.1–9.2)
NEUTROPHILS NFR BLD AUTO: 68.2 %
NRBC BLD AUTO-RTO: 0 %
PHOSPHATE SERPL-MCNC: 4.4 MG/DL (ref 2.3–4.7)
PLATELET # BLD AUTO: 190 X10(3)/MCL (ref 130–400)
PMV BLD AUTO: 12.8 FL (ref 9.4–12.4)
POTASSIUM SERPL-SCNC: 4.6 MMOL/L (ref 3.5–5.1)
RBC # BLD AUTO: 3.66 X10(6)/MCL
SODIUM SERPL-SCNC: 140 MMOL/L (ref 136–145)
WBC # SPEC AUTO: 7.7 X10(3)/MCL (ref 4.5–11.5)

## 2022-05-28 PROCEDURE — 83735 ASSAY OF MAGNESIUM: CPT | Performed by: STUDENT IN AN ORGANIZED HEALTH CARE EDUCATION/TRAINING PROGRAM

## 2022-05-28 PROCEDURE — 80048 BASIC METABOLIC PNL TOTAL CA: CPT | Performed by: STUDENT IN AN ORGANIZED HEALTH CARE EDUCATION/TRAINING PROGRAM

## 2022-05-28 PROCEDURE — 63600175 PHARM REV CODE 636 W HCPCS: Performed by: STUDENT IN AN ORGANIZED HEALTH CARE EDUCATION/TRAINING PROGRAM

## 2022-05-28 PROCEDURE — 85025 COMPLETE CBC W/AUTO DIFF WBC: CPT | Performed by: STUDENT IN AN ORGANIZED HEALTH CARE EDUCATION/TRAINING PROGRAM

## 2022-05-28 PROCEDURE — 25000003 PHARM REV CODE 250: Performed by: STUDENT IN AN ORGANIZED HEALTH CARE EDUCATION/TRAINING PROGRAM

## 2022-05-28 PROCEDURE — 84100 ASSAY OF PHOSPHORUS: CPT | Performed by: STUDENT IN AN ORGANIZED HEALTH CARE EDUCATION/TRAINING PROGRAM

## 2022-05-28 PROCEDURE — 36415 COLL VENOUS BLD VENIPUNCTURE: CPT | Performed by: STUDENT IN AN ORGANIZED HEALTH CARE EDUCATION/TRAINING PROGRAM

## 2022-05-28 RX ORDER — AMOXICILLIN 500 MG/1
500 CAPSULE ORAL EVERY 8 HOURS
Qty: 21 CAPSULE | Refills: 0 | Status: SHIPPED | OUTPATIENT
Start: 2022-05-28 | End: 2022-05-28 | Stop reason: SDUPTHER

## 2022-05-28 RX ORDER — AMOXICILLIN 500 MG/1
500 CAPSULE ORAL EVERY 8 HOURS
Qty: 20 CAPSULE | Refills: 0 | Status: SHIPPED | OUTPATIENT
Start: 2022-05-28 | End: 2022-06-04

## 2022-05-28 RX ORDER — LEVOTHYROXINE SODIUM 125 UG/1
125 TABLET ORAL
COMMUNITY

## 2022-05-28 RX ORDER — AMOXICILLIN 500 MG/1
500 CAPSULE ORAL EVERY 8 HOURS
Status: DISCONTINUED | OUTPATIENT
Start: 2022-05-28 | End: 2022-05-28 | Stop reason: HOSPADM

## 2022-05-28 RX ADMIN — OXYCODONE HYDROCHLORIDE 5 MG: 5 TABLET ORAL at 05:05

## 2022-05-28 RX ADMIN — ACETAMINOPHEN 325MG 650 MG: 325 TABLET ORAL at 05:05

## 2022-05-28 RX ADMIN — AMOXICILLIN 500 MG: 500 CAPSULE ORAL at 02:05

## 2022-05-28 RX ADMIN — AMPICILLIN SODIUM AND SULBACTAM SODIUM 1.5 G: 1; .5 INJECTION, POWDER, FOR SOLUTION INTRAMUSCULAR; INTRAVENOUS at 05:05

## 2022-05-28 NOTE — TERTIARY TRAUMA SURVEY NOTE
TERTIARY TRAUMA SURVEY (TTS)    List Injuries Identified to Date:   1.  Right orbital floor fracture, Nasal bone fracture    List Operative and Interventional Radiologic Procedures:   1.  None    Physical Assessment (pertinent findings):  Glascow Coma Scale: 15  Motor -  6  Verbal - 5  Eye opening- 4    TOTAL   = 15  Neurologic: no motor or sensory deficits  HEENT      Eyes: right eye proptosis with periorbital hematoma. No visual deficits.      Head: Atraumatic      Ears: no blood or discharge      Nose/sinus: Patent nares      Throat/Oropharynx: Patent oropharynx      Face: Atraumatic    Neck: Trachea midline    Chest: Equal chest rise bilaterally    Pulmonary:  CTA b/l  Cardiovascular       Heart: Regular rate      Peripheral vascular: 2+ radials b/l     Gastrointestinal      Abdominal: Soft, NT, ND      Rectal: Deferred    Genitourinary: No blood at meatus    Musculoskeletal:      Back: Non-tender      Extremities:            Upper: Full ROM          Lower: Full ROM    Tetanus given? No  EtOH elevated or +UDS on admission? No    Imaging Findings Review:    CT Scans:  CT Head: 1. No acute intracranial abnormalities.   2. Acute right orbital floor fracture with small volume right retrobulbar hemorrhage and proptosis.    CT C-spine: 1. No appreciable acute osseous abnormality   2. Severe cervical spondylosis   3. Degenerative listhesis at C2-C3.     CT Max Face: 1. Right orbital floor fracture with herniated orbital contents.  There is herniated orbital fat and distortion of the normal course of the inferior rectus muscle without alice muscular herniation.   2. Retro bulbar fat stranding suggestive of hematoma.  Right globe appears mildly proptotic.   3. Right nasal bone fracture   4. Right frontal scalp and periorbital hematoma   5. Right maxillary hemosinus       Ben Powers

## 2022-05-28 NOTE — CONSULTS
[facility]                       [address] [place]                      [place] [address]    PATIENT NAME:       LUIS DE OLIVEIRA  YOB: 1935  CSN:                [id number]   MRN:                [id number]  ADMIT DATE:         05/27/2022 12:44:00  PHYSICIAN:          Carlton Hanks MD                            CONSULTATION    DATE OF CONSULT:  05/27/2022 00:00:00    HISTORY OF PRESENT ILLNESS:  This is an 87-year-old female with a history of a   ground level fall with pain, swelling and decreased vision of the right eye   after a blunt force injury to the right side of the face after the fall.  She   has a history of no light perception vision in the left eye due to previous   treatment for melanoma and only a history of cataract surgery to the right eye.    CT scan done at [facility] shows periorbital edema and inferior floor   fracture with fluid in the maxillary sinus.  The globe is round and regular.    The lens is in correct position and there is no signs of vitreous hemorrhage or   retinal detachment.    On bedside examination, her visual acuity is 20/50 at near with an intraocular   pressure of 28 by Peng-Pen.  She does have periorbital lid edema.  The lids have   to be manually opened; therefore, the intraocular pressure may be falsely   elevated.  She does have a clear cornea.  She has erythematous ecchymotic   conjunctiva, but minimal proptosis.  Her anterior chamber is deep and quiet.    Her iris is round and regular, although constricted to 2-3 mm in size with poor   response to light.  After dilation, she shows evidence of a well placed   intraocular lens, clear vitreous and a normal retina.  She has a 0.4 cup to disk   ratio with no signs of disk edema or pallor.  Normal retinal vessels and macula   and no signs of retinal tears or retinal hemorrhage in the retinal periphery.    IMPRESSION:  Blunt orbital and  ocular trauma to the right globe causing orbital   floor fracture, periorbital edema, lid swelling and chemosis, but no signs of a   retrobulbar hematoma with compartment syndrome.    PLAN:  She should be able to have cool compresses to help with her periorbital   ecchymosis and edema.  She likely requires an evaluation by a surgeon who could   repair and evaluate the orbital floor fracture.  She will likely be admitted and   observed because of hemorrhagic emesis.      ______________________________  Carlton Hanks MD    RIB/AQS  DD:  05/27/2022  Time:  05:09PM  DT:  05/27/2022  Time:  09:54PM  Job #:  [id number]      CONSULTATION

## 2022-05-28 NOTE — DISCHARGE INSTRUCTIONS
-  recommendation for strict sinus precautions as follows-  absolutely no nose blowing or creating pressure in the maxillary sinuses for a total of 6 weeks to avoid additional subcutaneous air emphysema

## 2022-05-28 NOTE — NURSING
Patient AAOX4 and eager for discharge.   Complete discharge teaching and instructions given to patient and her daughter-in-law who is at bedside.  RX sent to [facility] due to [facility] retail pharmacy being closed. IV x2 d/cheryl. Cath tip intact.   All personal belongings packed and accounted for. Sinus precautions discussed with patient and her daughter-in-law. Both verbalized understanding and state no further questions or concerns at this time.

## 2022-05-28 NOTE — CONSULTS
Libertad Shepherd Unkn  05/27/2022    HPI:  Patient is a 87 y.o. adult with a h/o fall from standing salt from standing who is here today for evaluation of facial fractures    Tobacco use:  Denies    No past medical history on file.  No past surgical history on file.  No family history on file.  Social History     Socioeconomic History    Marital status:        Current Facility-Administered Medications:     0.9%  NaCl infusion, , Intravenous, Continuous, Fernando Troy MD    [START ON 5/28/2022] acetaminophen tablet 650 mg, 650 mg, Oral, Q6H, Fernando Troy MD    ampicillin-sulbactam (UNASYN) 1.5 g in sodium chloride 0.9 % 100 mL IVPB (MB+), 1.5 g, Intravenous, Q8H, Fernando Troy MD    gabapentin capsule 100 mg, 100 mg, Oral, TID, Fernando Troy MD    melatonin tablet 6 mg, 6 mg, Oral, Nightly PRN, Fernando Troy MD    morphine injection 2 mg, 2 mg, Intravenous, Q2H PRN, Fernando Troy MD    ondansetron injection 4 mg, 4 mg, Intravenous, Q6H PRN, Fernando Troy MD    oxyCODONE immediate release tablet 10 mg, 10 mg, Oral, Q4H PRN, Fernando Troy MD    oxyCODONE immediate release tablet 5 mg, 5 mg, Oral, Q4H PRN, Fernando Troy MD    Current Outpatient Medications:     aspirin (ECOTRIN) 81 MG EC tablet, Take 81 mg by mouth once daily., Disp: , Rfl:     celecoxib (CELEBREX) 100 MG capsule, Take 100 mg by mouth 2 (two) times daily., Disp: , Rfl:     citalopram (CELEXA) 40 MG tablet, Take 40 mg by mouth once daily., Disp: , Rfl:     fluconazole (DIFLUCAN) 150 MG Tab, Take 150 mg by mouth every 48 hours., Disp: , Rfl:     melatonin 5 mg Cap, Take by mouth., Disp: , Rfl:     nebivoloL (BYSTOLIC) 5 MG Tab, Take 5 mg by mouth once daily., Disp: , Rfl:     nystatin (MYCOSTATIN) cream, Apply topically 2 (two) times daily., Disp: , Rfl:     rOPINIRole (REQUIP) 2 MG tablet, Take 1 mg by mouth nightly., Disp: , Rfl:     REVIEW OF SYSTEMS:  General: negative; ENT: otherwise negative; Allergy and  Immunology: negative; Hematological and Lymphatic: Negative; Endocrine: negative; Respiratory: no cough, shortness of breath, or wheezing; Cardiovascular: no chest pain or dyspnea on exertion; Gastrointestinal: no abdominal pain/back, change in bowel habits, or bloody stools; Genito-Urinary: no dysuria, trouble voiding, or hematuria; Musculoskeletal: negative  Neurological: no TIA or stroke symptoms; Psychiatric: no nervousness, anxiety or depression.    PHYSICAL EXAM:      Pulse: (!) 58  Temp: 98.4 °F (36.9 °C)      General appearance:  Alert, well-appearing, and in no distress.  Oriented to person, place, and time   Neurological:  Normal speech, no focal findings noted; CN II - XII grossly intact           Musculoskeletal: MOAW  Normal muscle strength/tone.        HEENT: Cardinal directions the eye same cored out with some limitation in upward gaze though it is not a hard stop and is slightly limited, with gross visual acuity intact patient was able to read easily from 4B and discriminate all colors also the patient had no disproportionate pain as compared to presentation.  Significant periorbital ecchymosis edema some some conjunctival hemorrhage as well as chemosis,   difficult to get a pupillary exam on the patient because of the amount of periorbital edema.  Septum midline no septal hematoma present.     Patient has no visual acuity in the left eye related to a previous surgery related to melanoma of the retina.                   Neck:  Supple, no significant adenopathy;                Chest:   Clear to auscultation, no wheezes, rales or rhonchi, symmetric air entry      No use of accessory muscles             Cardiac:  Normal rate and regular rhythm, S1 and S2 normal; PMI non-displaced          Abdomen:  Soft, nontender, nondistended, bowel sounds normal      Extremities:   2+ femoral pulses bilaterally         IMAGING:  Orbital floor fracture as well as nasal bone fracture.    IMP/PLAN:       87 y.o. adult  with right orbital floor blowout fracture right nasal bone fracture, also with significant ecchymosis chemosis and edema of the right eye.    -this patient had clear gross discrimination of the right eye, she is easily able to discriminate different colors as well as read from 4 ft.  Based on the patient's pre-existing left eye blindness there is no necessity for orbital reconstruction of the right eye has this defect would likely be cosmetic in nature only and the patient is not concerned about enophthalmos.  Patient also is most concerned about any additional risk due to her remaining intact vision thus is electing non operative treatment.  I spoke with Dr. Hanks with ophthalmology and he concurs, he recommended outpatient follow-up with a dilated retinal exam by her primary ophthalmologist Dr. Wolfe.  -  recommendation for strict sinus precautions with absolutely no nose blowing or creating pressure in the maxillary sinuses for a total of 6 weeks to avoid additional subcutaneous air emphysema.    -  patient can be covered with beta-lactam or equivalent substitution to cover sinus bacteria for 1 week(Amoxil 500 mg t.i.d. times 21)  -  follow-up with [facility]  phone number is [phone] in 1 week.         Kris Loya MD

## 2022-05-28 NOTE — PLAN OF CARE
Problem: Adult Inpatient Plan of Care  Goal: Plan of Care Review  Outcome: Ongoing, Progressing  Goal: Patient-Specific Goal (Individualized)  Outcome: Ongoing, Progressing  Goal: Absence of Hospital-Acquired Illness or Injury  Outcome: Ongoing, Progressing  Goal: Optimal Comfort and Wellbeing  Outcome: Ongoing, Progressing  Goal: Readiness for Transition of Care  Outcome: Ongoing, Progressing     Problem: Impaired Wound Healing  Goal: Optimal Wound Healing  Outcome: Ongoing, Progressing     Problem: Fall Injury Risk  Goal: Absence of Fall and Fall-Related Injury  Outcome: Ongoing, Progressing

## 2022-05-28 NOTE — PLAN OF CARE
Problem: Adult Inpatient Plan of Care  Goal: Plan of Care Review  Outcome: Met  Goal: Patient-Specific Goal (Individualized)  Outcome: Met  Goal: Absence of Hospital-Acquired Illness or Injury  Outcome: Met  Goal: Optimal Comfort and Wellbeing  Outcome: Met  Goal: Readiness for Transition of Care  Outcome: Met     Problem: Impaired Wound Healing  Goal: Optimal Wound Healing  Outcome: Met     Problem: Fall Injury Risk  Goal: Absence of Fall and Fall-Related Injury  Outcome: Met

## 2022-05-28 NOTE — DISCHARGE SUMMARY
[facility]  DISCHARGE SUMMARY  General Surgery      Admit Date:  5/27/2022    Discharge Date and Time:  5/28/2022  12:00 PM    Attending Physician:  Mio Rodriguez Jr., MD     Discharge Provider:  Ben Powers MD     Reason for Admission:  Closed head injury without loss of consciousness     Procedures Performed:  * No surgery found *    Hospital Course:  Please see the preoperative H&P and other available documentation for full details related to history prior to this admission.  Briefly, Ally Healy is a 87 y.o. adult who was admitted on 5/27/22 after she fell at home and sustained right orbital fracture with entrapment and a nasal bone fracture. The patient is blind in the left eye and has no visual deficits on the right. Ophthalmology and OMFS evaluated the patient and elected for non-operative management of the fracture given no loss of function. She was subsequently discharged home with home health on 5/28/22 with PO augmentin. She will follow up with OMFS as an outpatient.    Consults:  OMFS, Ophthalmology    Significant Diagnostic Studies:   Recent Labs   Lab 05/27/22  1303 05/28/22  0554   WBC 9.9 7.7   HGB 12.8 12.0   HCT 39.4 38.0    190     Recent Labs   Lab 05/27/22  1303 05/28/22  0554    140   K 4.0 4.6   CO2 22* 32*   BUN 14.7 19.1   CREATININE 0.90 1.21*   CALCIUM 9.2 9.1   MG  --  1.90   PHOS  --  4.4   AST 74*  --    ALT 37  --    ALKPHOS 94  --    BILITOT 0.5  --    ALBUMIN 3.5  --      Recent Labs   Lab 05/27/22  1303   INR 1.05   PTT 27.3   No results for input(s): PH, PCO2, PO2, HCO3 in the last 72 hours.      Final Diagnoses:   Principal Problem:  Closed head injury without loss of consciousness   Secondary Diagnoses:    Active Hospital Problems    Diagnosis  POA    *Closed head injury without loss of consciousness [S09.90XA]  Yes      Resolved Hospital Problems   No resolved problems to display.       Discharged Condition:   Stable    Disposition:  Home or Self Care    Follow Up/Patient Instructions: OMFS, Home ophthalmologist    Medications:  Reconciled Home Medications:    Current Discharge Medication List      START taking these medications    Details   amoxicillin (AMOXIL) 500 MG capsule Take 1 capsule (500 mg total) by mouth every 8 (eight) hours. for 7 days  Qty: 21 capsule, Refills: 0         CONTINUE these medications which have NOT CHANGED    Details   aspirin (ECOTRIN) 81 MG EC tablet Take 81 mg by mouth once daily.      celecoxib (CELEBREX) 100 MG capsule Take 100 mg by mouth 2 (two) times daily.      levothyroxine (SYNTHROID) 125 MCG tablet Take 125 mcg by mouth before breakfast.      melatonin 5 mg Cap Take by mouth.      rOPINIRole (REQUIP) 2 MG tablet Take 1 mg by mouth nightly.         STOP taking these medications       citalopram (CELEXA) 40 MG tablet Comments:   Reason for Stopping:         fluconazole (DIFLUCAN) 150 MG Tab Comments:   Reason for Stopping:         nebivoloL (BYSTOLIC) 5 MG Tab Comments:   Reason for Stopping:         nystatin (MYCOSTATIN) cream Comments:   Reason for Stopping:             Discharge Procedure Orders   Diet general      Follow-up Information     ROSAS CAMPOS MD. Schedule an appointment as soon as possible for a visit.    Specialty: Ophthalmology  Why: dilated retinal exam  Contact information:  [address] [place] [address]  [address]  [facility] [address]  [phone]             [facility] Follow up in 1 week(s).    Contact information:  # [phone]                       Ben Powers MD

## 2022-08-17 NOTE — CLINICAL REVIEW
87-year-old female who sustained a right orbital fracture with entrapment  and a nasal bone fracture following a fall.  She was evaluated by Ophthalmology, oral maxillofacial surgery.  The patient elected for non operative management of the right orbital fracture.  The patient was eventually discharged.  However, based on the degree of a right orbital fracture with entrapment, and based on the [facility] Inpatient & Surgical Care 26th Edition (Publish Date 06/17/2022): General Recovery Care > Body System General Recovery Guidelines >Head and Neck Disease GRG (MG-HND), hospital admission is appropriate for Ocular or visual findings (eg, diplopia, reduced visual acuity, abnormal ocular examination) with suspected etiology that requires evaluation or treatment beyond scope of observation care.  Based on the above criteria, the guidelines are met for IP LOC    mvbmd